# Patient Record
Sex: MALE | Race: OTHER | HISPANIC OR LATINO | URBAN - NONMETROPOLITAN AREA
[De-identification: names, ages, dates, MRNs, and addresses within clinical notes are randomized per-mention and may not be internally consistent; named-entity substitution may affect disease eponyms.]

---
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
## 2020-09-11 ENCOUNTER — IMPORTED ENCOUNTER (OUTPATIENT)
Dept: URBAN - NONMETROPOLITAN AREA CLINIC 1 | Facility: CLINIC | Age: 20
End: 2020-09-11

## 2020-09-11 PROCEDURE — 92014 COMPRE OPH EXAM EST PT 1/>: CPT

## 2020-09-11 PROCEDURE — 92015 DETERMINE REFRACTIVE STATE: CPT

## 2022-04-09 ASSESSMENT — TONOMETRY
OD_IOP_MMHG: 17
OD_IOP_MMHG: 20
OS_IOP_MMHG: 16
OS_IOP_MMHG: 20

## 2022-04-09 ASSESSMENT — VISUAL ACUITY
OD_SC: 20/20-
OD_SC: 20/20
OS_SC: 20/20
OS_SC: 20/20-

## 2022-04-27 ENCOUNTER — ESTABLISHED PATIENT (OUTPATIENT)
Dept: URBAN - NONMETROPOLITAN AREA CLINIC 1 | Facility: CLINIC | Age: 22
End: 2022-04-27

## 2022-04-27 DIAGNOSIS — H52.13: ICD-10-CM

## 2022-04-27 PROCEDURE — 92014 COMPRE OPH EXAM EST PT 1/>: CPT

## 2022-04-27 PROCEDURE — 92015 DETERMINE REFRACTIVE STATE: CPT

## 2022-04-27 ASSESSMENT — TONOMETRY
OS_IOP_MMHG: 18
OD_IOP_MMHG: 16

## 2022-04-27 ASSESSMENT — VISUAL ACUITY
OD_CC: 20/20
OS_CC: 20/20

## 2022-04-27 NOTE — PATIENT DISCUSSION
Myopia OUDiscussed refractive status in detail with patient and patient's fatherNew glasses Rx given todayContinue to monitor RTC 1 year complete.

## 2022-05-09 ENCOUNTER — CONTACT LENSES/GLASSES VISIT (OUTPATIENT)
Dept: URBAN - NONMETROPOLITAN AREA CLINIC 1 | Facility: CLINIC | Age: 22
End: 2022-05-09

## 2022-05-09 DIAGNOSIS — H52.13: ICD-10-CM

## 2022-05-09 PROCEDURE — 92310-E CONTACT LENS FITTING ESTABLISH PATIENT

## 2022-05-09 ASSESSMENT — KERATOMETRY
OD_K2POWER_DIOPTERS: 0
OS_K2POWER_DIOPTERS: 055
OS_K1POWER_DIOPTERS: 42.25
OD_AXISANGLE2_DEGREES: 132
OS_AXISANGLE_DEGREES: 42.75
OS_AXISANGLE2_DEGREES: 132
OD_K1POWER_DIOPTERS: 42.25
OD_AXISANGLE_DEGREES: 42.25

## 2022-05-09 NOTE — PATIENT DISCUSSION
5/9/2022:  CL fitting today, trial of Air Optix Hydraglyde given today (-1.75 OD and -2.00 OS). Patient to see Rsos Camacho for continued fitting and proper hygiene.

## 2022-05-16 ENCOUNTER — CONTACT LENSES/GLASSES VISIT (OUTPATIENT)
Dept: URBAN - NONMETROPOLITAN AREA CLINIC 1 | Facility: CLINIC | Age: 22
End: 2022-05-16

## 2022-05-16 DIAGNOSIS — H52.13: ICD-10-CM

## 2022-05-16 PROCEDURE — 92310-E CONTACT LENS FITTING ESTABLISH PATIENT

## 2022-05-16 ASSESSMENT — KERATOMETRY
OD_K1POWER_DIOPTERS: 42.25
OD_AXISANGLE_DEGREES: 42.25
OS_AXISANGLE2_DEGREES: 132
OS_AXISANGLE_DEGREES: 42.75
OS_K1POWER_DIOPTERS: 42.25
OD_AXISANGLE2_DEGREES: 132
OD_K2POWER_DIOPTERS: 0
OS_K2POWER_DIOPTERS: 055

## 2022-05-16 ASSESSMENT — VISUAL ACUITY
OS_CC: 20/20
OD_CC: 20/20-1

## 2022-05-16 NOTE — PATIENT DISCUSSION
5/9/2022:  CL fitting today, trial of Air Optix Hydraglyde given today (-1.75 OD and -2.00 OS). Patient to see Dayan Stringer for continued fitting and proper hygiene.

## 2022-05-16 NOTE — PATIENT DISCUSSION
5/16/2022: CL Check, patient is doing well. Will finalize CL RX and patient may order. Continue to monitor.